# Patient Record
Sex: FEMALE | Race: WHITE | URBAN - METROPOLITAN AREA
[De-identification: names, ages, dates, MRNs, and addresses within clinical notes are randomized per-mention and may not be internally consistent; named-entity substitution may affect disease eponyms.]

---

## 2017-04-26 ENCOUNTER — HISTORICAL (OUTPATIENT)
Dept: URGENT CARE | Facility: CLINIC | Age: 57
End: 2017-04-26

## 2017-12-20 ENCOUNTER — HISTORICAL (OUTPATIENT)
Dept: URGENT CARE | Facility: CLINIC | Age: 57
End: 2017-12-20

## 2017-12-20 LAB
BILIRUB SERPL-MCNC: NEGATIVE MG/DL
BLOOD URINE, POC: NORMAL
CLARITY, POC UA: NORMAL
COLOR, POC UA: NORMAL
GLUCOSE UR QL STRIP: NEGATIVE
KETONES UR QL STRIP: NEGATIVE
LEUKOCYTE EST, POC UA: NORMAL
NITRITE, POC UA: NEGATIVE
PH, POC UA: 6
PROTEIN, POC: NEGATIVE
SPECIFIC GRAVITY, POC UA: 1
UROBILINOGEN, POC UA: NORMAL

## 2018-08-21 ENCOUNTER — HISTORICAL (OUTPATIENT)
Dept: ADMINISTRATIVE | Facility: HOSPITAL | Age: 58
End: 2018-08-21

## 2022-04-10 ENCOUNTER — HISTORICAL (OUTPATIENT)
Dept: ADMINISTRATIVE | Facility: HOSPITAL | Age: 62
End: 2022-04-10

## 2022-04-29 VITALS
OXYGEN SATURATION: 100 % | SYSTOLIC BLOOD PRESSURE: 167 MMHG | WEIGHT: 126.56 LBS | HEIGHT: 64 IN | DIASTOLIC BLOOD PRESSURE: 94 MMHG | BODY MASS INDEX: 21.61 KG/M2

## 2022-05-04 NOTE — HISTORICAL [facility] CERNER
This is a historical note converted from [employer]. Formatting and pictures may have been removed.  Please reference [employer]lisa for original formatting and attached multimedia. Chief Complaint  Cleaning whirlpool tub and felt something ,move. Possible broken rib  History of Present Illness  58-year-old female presents with?left anterior?axillary rib cage pain.? States was cleaning her?whirlpool tub?yesterday stretched and felt pain to the left?axillary rib cage area.? Denies shortness of breath.  Review of Systems  Constitutional_no fever, fatigue, weakness  Musculoskeletal_[left sided rib cage pain]  Integumentary_no skin rash or abnormal lesion  Neurologic_no headache, no dizziness, no weakness or numbness  ?  Physical Exam  Vitals & Measurements  T:?36.7? ?C (Oral)? HR:?60(Peripheral)? RR:?18? BP:?167/94? SpO2:?100%?  HT:?163?cm? HT:?163?cm? WT:?57.4?kg? WT:?57.4?kg? BMI:?21.6?  General_well-developed well-nourished in no acute distress  Musculoskeletal_[pain to palpation to the?distal anterior?and axillary rib cage. ?Minimal swelling without contusion.]  Integumentary_no rashes or skin lesions present  Neurologic_ cranial nerves intact, no signs of peripheral neurological deficit, motor/sensory function intact  Respiratory_equal bilateral breath sounds clear bilaterally  Assessment/Plan  1.?Rib pain  ? Modify activity as necessary, gentle stretching suggested  Use either ice pack for pain relief or localized heat to promote healing as needed  Use medications either over-the-counter or prescription as prescribed/needed  Contact this clinic if not improved with this treatment plan over the next 7-14 days  ?  Ordered:  cyclobenzaprine, 10 mg = 1 tab(s), Oral, TID, PRN PRN as needed for spasm, # 30 tab(s), 0 Refill(s), Pharmacy: [facility]/pharmacy #[id number]  diclofenac, 75 mg = 1 tab(s), Oral, BID, # 20 tab(s), 0 Refill(s), Pharmacy: [facility]/pharmacy #[id number]  Office/Outpatient Visit Level 3 Established [id number] PC, Rib pain, [facility],  08/21/18 13:42:00 CDT  XR Ribs Left W PA Chest, Routine, 08/21/18 13:13:00 CDT, Chest Pain, None, Ambulatory, Rad Type, Rib pain, Not Scheduled, 08/21/18 13:13:00 CDT  ?   Problem List/Past Medical History  Ongoing  No chronic problems  Historical  No qualifying data  Procedure/Surgical History  Tonsillectomy (1966)   Medications  calcium (as carbonate) 500 mg oral tablet, chewable, 1000 mg= 2 tab(s), Chewed, Daily  cyclobenzaprine 10 mg oral tablet, 10 mg= 1 tab(s), Oral, TID, PRN  diclofenac sodium 75 mg oral delayed release tablet, 75 mg= 1 tab(s), Oral, BID  Vitamin D3  Allergies  Macrobid?([place])  Social History  Alcohol  Current, Wine, 1-2 times per month, 12/20/2017  Substance Abuse  Never, 12/20/2017  Tobacco  Never smoker, 07/20/2015  Family History  CAD - Coronary artery disease: Father.  Primary malignant neoplasm of breast: Mother.  Health Maintenance  Health Maintenance  ???Pending?(in the next year)  ??? ??OverDue  ??? ? ? ?Cervical Cancer Screening due??12/14/13??and every 3??year(s)  ??? ??Due?  ??? ? ? ?Alcohol Misuse Screening due??08/21/18??and every 1??year(s)  ??? ? ? ?Aspirin Therapy for CVD Prevention due??08/21/18??and every 1??year(s)  ??? ? ? ?Breast Cancer Screening due??08/21/18??and every?  ??? ? ? ?Colorectal Screening due??08/21/18??and every?  ??? ? ? ?Depression Screening due??08/21/18??and every?  ??? ? ? ?Diabetes Screening due??08/21/18??and every?  ??? ? ? ?Influenza Vaccine due??08/21/18??and every?  ??? ? ? ?Lipid Screening due??08/21/18??and every?  ??? ? ? ?Tetanus Vaccine due??08/21/18??and every 10??year(s)  ??? ??Due In Future?  ??? ? ? ?Blood Pressure Screening not due until??12/20/18??and every 1??year(s)  ??? ? ? ?Body Mass Index Check not due until??12/20/18??and every 1??year(s)  ???Satisfied?(in the past 1 year)  ??? ??Satisfied?  ??? ? ? ?Blood Pressure Screening on??08/21/18.??Satisfied by Yana Cardona  ??? ? ? ?Body Mass Index Check on??08/21/18.??Satisfied  by Yana Cardona.  ??? ? ? ?Obesity Screening on??08/21/18.??Satisfied by Yana Cardona.  ?  ?  Diagnostic Results  Chest x-ray with left sided unilateral ribs.? No acute fracture dislocation.

## 2022-09-21 ENCOUNTER — HISTORICAL (OUTPATIENT)
Dept: ADMINISTRATIVE | Facility: HOSPITAL | Age: 62
End: 2022-09-21